# Patient Record
Sex: MALE | URBAN - METROPOLITAN AREA
[De-identification: names, ages, dates, MRNs, and addresses within clinical notes are randomized per-mention and may not be internally consistent; named-entity substitution may affect disease eponyms.]

---

## 2021-02-24 ENCOUNTER — TELEPHONE (OUTPATIENT)
Dept: ORTHOPEDICS | Facility: CLINIC | Age: 21
End: 2021-02-24

## 2021-02-24 NOTE — TELEPHONE ENCOUNTER
Prior to calling patient, RN consulted with Ekaterina MITCHELL and per Ekaterina, typically Dr. Montoya doesn't see patients with this issue and Dr. Issa and or Dr. Lozoya will be more suitable. Ekaterina stated patient can be seen virtually to start and the earliest availability is with Dr. Issa March 2nd any virtual spots.  RN then called and left patient with a detailed information regarding this.  If patient does call back, please offer an appt for patient to see Dr. Issa on March 2nd any virtual spots. Thank you    Kathia Ly RN      [facility]    Phone Message    May a detailed message be left on voicemail: yes     Reason for Call: Other: Patient was referred from Dr. 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 According to the protocols rd does not see shoulder. Patient is wondering if this is something he would be willing to take a look at or what other options he could go with. Please call patient when able.     Action Taken: Message routed to:  [facility] ([facility] ortho    Travel Screening: Not Applicable